# Patient Record
Sex: FEMALE | Race: WHITE | NOT HISPANIC OR LATINO
[De-identification: names, ages, dates, MRNs, and addresses within clinical notes are randomized per-mention and may not be internally consistent; named-entity substitution may affect disease eponyms.]

---

## 2019-07-23 ENCOUNTER — TRANSCRIPTION ENCOUNTER (OUTPATIENT)
Age: 69
End: 2019-07-23

## 2019-07-24 ENCOUNTER — OUTPATIENT (OUTPATIENT)
Dept: OUTPATIENT SERVICES | Facility: HOSPITAL | Age: 69
LOS: 1 days | End: 2019-07-24
Payer: MEDICARE

## 2019-07-24 VITALS
HEIGHT: 64 IN | TEMPERATURE: 98 F | OXYGEN SATURATION: 97 % | WEIGHT: 182.1 LBS | RESPIRATION RATE: 18 BRPM | HEART RATE: 74 BPM | SYSTOLIC BLOOD PRESSURE: 167 MMHG | DIASTOLIC BLOOD PRESSURE: 72 MMHG

## 2019-07-24 VITALS
OXYGEN SATURATION: 97 % | HEART RATE: 69 BPM | RESPIRATION RATE: 12 BRPM | DIASTOLIC BLOOD PRESSURE: 80 MMHG | SYSTOLIC BLOOD PRESSURE: 150 MMHG

## 2019-07-24 DIAGNOSIS — Z98.891 HISTORY OF UTERINE SCAR FROM PREVIOUS SURGERY: Chronic | ICD-10-CM

## 2019-07-24 DIAGNOSIS — H25.12 AGE-RELATED NUCLEAR CATARACT, LEFT EYE: ICD-10-CM

## 2019-07-24 LAB — GLUCOSE BLDC GLUCOMTR-MCNC: 127 MG/DL — HIGH (ref 70–99)

## 2019-07-24 PROCEDURE — 66984 XCAPSL CTRC RMVL W/O ECP: CPT | Mod: LT

## 2019-07-24 PROCEDURE — V2788: CPT

## 2019-07-24 PROCEDURE — 82962 GLUCOSE BLOOD TEST: CPT

## 2019-07-24 NOTE — ASU DISCHARGE PLAN (ADULT/PEDIATRIC) - NURSING INSTRUCTIONS
Follow up appointment with Dr. Hudson on 7/25/19 @10:15AM 0 = swallows foods/liquids without difficulty

## 2019-07-24 NOTE — ASU PATIENT PROFILE, ADULT - PMH
Asthma    Cataracts, bilateral    Diabetes  type II  GERD (gastroesophageal reflux disease)    Hyperlipidemia    Lower back pain    Osteoporosis    Salivary gland stone    Sleep apnea    UTI (urinary tract infection)

## 2019-07-24 NOTE — ASU DISCHARGE PLAN (ADULT/PEDIATRIC) - CALL YOUR DOCTOR IF YOU HAVE ANY OF THE FOLLOWING:
Pain not relieved by Medications/Fever greater than (need to indicate Fahrenheit or Celsius)/Swelling that gets worse/Bleeding that does not stop/Nausea and vomiting that does not stop

## 2019-07-24 NOTE — ASU DISCHARGE PLAN (ADULT/PEDIATRIC) - PATIENT EDUCATION MATERIALS PROVIED
Other (specify)/Intraocular lens implant(IOL), Eye shield with instructions , sunglasses and eye kit given to patient./Implant card (specify)

## 2019-07-24 NOTE — ASU DISCHARGE PLAN (ADULT/PEDIATRIC) - CARE PROVIDER_API CALL
Jenn Hudson)  Ophthalmology  [address] [facility], [address] [place] [address]  Phone: [phone]  Fax: [phone]  Follow Up Time:

## 2019-08-03 PROBLEM — E78.5 HYPERLIPIDEMIA, UNSPECIFIED: Chronic | Status: ACTIVE | Noted: 2019-07-24

## 2019-08-03 PROBLEM — K11.5 SIALOLITHIASIS: Chronic | Status: ACTIVE | Noted: 2019-07-24

## 2019-08-03 PROBLEM — M81.0 AGE-RELATED OSTEOPOROSIS WITHOUT CURRENT PATHOLOGICAL FRACTURE: Chronic | Status: ACTIVE | Noted: 2019-07-24

## 2019-08-03 PROBLEM — M54.5 LOW BACK PAIN: Chronic | Status: ACTIVE | Noted: 2019-07-24

## 2019-08-03 PROBLEM — E11.9 TYPE 2 DIABETES MELLITUS WITHOUT COMPLICATIONS: Chronic | Status: ACTIVE | Noted: 2019-07-24

## 2019-08-03 PROBLEM — K21.9 GASTRO-ESOPHAGEAL REFLUX DISEASE WITHOUT ESOPHAGITIS: Chronic | Status: ACTIVE | Noted: 2019-07-24

## 2019-08-03 PROBLEM — H26.9 UNSPECIFIED CATARACT: Chronic | Status: ACTIVE | Noted: 2019-07-24

## 2019-08-03 PROBLEM — J45.909 UNSPECIFIED ASTHMA, UNCOMPLICATED: Chronic | Status: ACTIVE | Noted: 2019-07-24

## 2019-08-03 PROBLEM — N39.0 URINARY TRACT INFECTION, SITE NOT SPECIFIED: Chronic | Status: ACTIVE | Noted: 2019-07-24

## 2019-09-30 NOTE — ASU PATIENT PROFILE, ADULT - PATIENT REPRESENTATIVE NAME
Pt didn't return phone calls. PHQ-9 missed. I will close the encounter until further outreach. Leny Flowers CMA ([facility])     Josephine ortega

## 2020-01-21 ENCOUNTER — TRANSCRIPTION ENCOUNTER (OUTPATIENT)
Age: 70
End: 2020-01-21

## 2020-01-22 ENCOUNTER — OUTPATIENT (OUTPATIENT)
Dept: OUTPATIENT SERVICES | Facility: HOSPITAL | Age: 70
LOS: 1 days | End: 2020-01-22
Payer: MEDICARE

## 2020-01-22 VITALS
RESPIRATION RATE: 15 BRPM | OXYGEN SATURATION: 98 % | SYSTOLIC BLOOD PRESSURE: 155 MMHG | HEART RATE: 69 BPM | HEIGHT: 64 IN | WEIGHT: 182.98 LBS | DIASTOLIC BLOOD PRESSURE: 93 MMHG | TEMPERATURE: 98 F

## 2020-01-22 VITALS
DIASTOLIC BLOOD PRESSURE: 72 MMHG | SYSTOLIC BLOOD PRESSURE: 143 MMHG | OXYGEN SATURATION: 100 % | HEART RATE: 64 BPM | RESPIRATION RATE: 17 BRPM

## 2020-01-22 DIAGNOSIS — Z98.891 HISTORY OF UTERINE SCAR FROM PREVIOUS SURGERY: Chronic | ICD-10-CM

## 2020-01-22 DIAGNOSIS — Z98.49 CATARACT EXTRACTION STATUS, UNSPECIFIED EYE: Chronic | ICD-10-CM

## 2020-01-22 DIAGNOSIS — Z98.890 OTHER SPECIFIED POSTPROCEDURAL STATES: Chronic | ICD-10-CM

## 2020-01-22 DIAGNOSIS — H25.11 AGE-RELATED NUCLEAR CATARACT, RIGHT EYE: ICD-10-CM

## 2020-01-22 LAB — GLUCOSE BLDC GLUCOMTR-MCNC: 106 MG/DL — HIGH (ref 70–99)

## 2020-01-22 PROCEDURE — 82962 GLUCOSE BLOOD TEST: CPT

## 2020-01-22 PROCEDURE — V2632: CPT

## 2020-01-22 PROCEDURE — 66984 XCAPSL CTRC RMVL W/O ECP: CPT | Mod: RT

## 2020-01-22 NOTE — ASU DISCHARGE PLAN (ADULT/PEDIATRIC) - CALL YOUR DOCTOR IF YOU HAVE ANY OF THE FOLLOWING:
Fever greater than (need to indicate Fahrenheit or Celsius)/Bleeding that does not stop/Increased irritability or sluggishness/Pain not relieved by Medications/Nausea and vomiting that does not stop/Inability to tolerate liquids or foods

## 2020-01-22 NOTE — ASU DISCHARGE PLAN (ADULT/PEDIATRIC) - ASU DC SPECIAL INSTRUCTIONSFT
do not rub eye. tylenol for discomfort.  avoid advil motrin aleve aspirin to decrease chance of bleeding. eye kit given to pt.  Discharge and follow up  instructions explained to pt.  pt understands proper use of eye drops and instructions. f/u tomorrow@ 1030am do not rub eye. tylenol for discomfort.  avoid advil motrin aleve aspirin to decrease chance of bleeding. eye kit given to pt.  Discharge and follow up  instructions explained to pt.  pt understands proper use of eye drops and instructions. f/u tomorrow@ 1030am  Please call the office and page the Doctor if you experience pain and / or nausea

## 2020-01-22 NOTE — ASU DISCHARGE PLAN (ADULT/PEDIATRIC) - CARE PROVIDER_API CALL
Jenn Hudson)  Ophthalmology  [address] [facility], [address] [place] [address]  Phone: [phone]  Fax: [phone]  Follow Up Time:

## 2020-02-28 ENCOUNTER — TRANSCRIPTION ENCOUNTER (OUTPATIENT)
Age: 70
End: 2020-02-28

## 2020-07-07 ENCOUNTER — TRANSCRIPTION ENCOUNTER (OUTPATIENT)
Age: 70
End: 2020-07-07

## 2020-07-16 PROBLEM — G47.30 SLEEP APNEA, UNSPECIFIED: Chronic | Status: ACTIVE | Noted: 2019-07-24

## 2020-07-29 DIAGNOSIS — Z01.818 ENCOUNTER FOR OTHER PREPROCEDURAL EXAMINATION: ICD-10-CM

## 2020-07-29 PROBLEM — Z00.00 ENCOUNTER FOR PREVENTIVE HEALTH EXAMINATION: Status: ACTIVE | Noted: 2020-07-29

## 2020-08-01 ENCOUNTER — APPOINTMENT (OUTPATIENT)
Dept: DISASTER EMERGENCY | Facility: CLINIC | Age: 70
End: 2020-08-01

## 2020-08-02 LAB — SARS-COV-2 N GENE NPH QL NAA+PROBE: NOT DETECTED

## 2020-10-01 ENCOUNTER — TRANSCRIPTION ENCOUNTER (OUTPATIENT)
Age: 70
End: 2020-10-01

## 2020-11-27 ENCOUNTER — TRANSCRIPTION ENCOUNTER (OUTPATIENT)
Age: 70
End: 2020-11-27

## 2020-12-03 ENCOUNTER — TRANSCRIPTION ENCOUNTER (OUTPATIENT)
Age: 70
End: 2020-12-03

## 2020-12-26 ENCOUNTER — TRANSCRIPTION ENCOUNTER (OUTPATIENT)
Age: 70
End: 2020-12-26

## 2021-08-09 ENCOUNTER — TRANSCRIPTION ENCOUNTER (OUTPATIENT)
Age: 71
End: 2021-08-09

## 2021-08-12 ENCOUNTER — TRANSCRIPTION ENCOUNTER (OUTPATIENT)
Age: 71
End: 2021-08-12

## 2021-09-27 ENCOUNTER — TRANSCRIPTION ENCOUNTER (OUTPATIENT)
Age: 71
End: 2021-09-27

## 2022-01-13 ENCOUNTER — TRANSCRIPTION ENCOUNTER (OUTPATIENT)
Age: 72
End: 2022-01-13

## 2022-08-25 ENCOUNTER — NON-APPOINTMENT (OUTPATIENT)
Age: 72
End: 2022-08-25

## 2022-12-18 ENCOUNTER — NON-APPOINTMENT (OUTPATIENT)
Age: 72
End: 2022-12-18

## 2022-12-26 ENCOUNTER — NON-APPOINTMENT (OUTPATIENT)
Age: 72
End: 2022-12-26

## 2023-03-21 NOTE — ASU PATIENT PROFILE, ADULT - CAREGIVER PHONE NUMBER
[phone] Cimzia Pregnancy And Lactation Text: This medication crosses the placenta but can be considered safe in certain situations. Cimzia may be excreted in breast milk.

## 2023-03-22 ENCOUNTER — TRANSCRIPTION ENCOUNTER (OUTPATIENT)
Age: 73
End: 2023-03-22

## 2023-03-23 ENCOUNTER — OUTPATIENT (OUTPATIENT)
Dept: OUTPATIENT SERVICES | Facility: HOSPITAL | Age: 73
LOS: 1 days | End: 2023-03-23
Payer: MEDICARE

## 2023-03-23 DIAGNOSIS — Z12.11 ENCOUNTER FOR SCREENING FOR MALIGNANT NEOPLASM OF COLON: ICD-10-CM

## 2023-03-23 DIAGNOSIS — Z98.890 OTHER SPECIFIED POSTPROCEDURAL STATES: Chronic | ICD-10-CM

## 2023-03-23 DIAGNOSIS — Z98.891 HISTORY OF UTERINE SCAR FROM PREVIOUS SURGERY: Chronic | ICD-10-CM

## 2023-03-23 DIAGNOSIS — K21.9 GASTRO-ESOPHAGEAL REFLUX DISEASE WITHOUT ESOPHAGITIS: ICD-10-CM

## 2023-03-23 DIAGNOSIS — Z98.49 CATARACT EXTRACTION STATUS, UNSPECIFIED EYE: Chronic | ICD-10-CM

## 2023-03-23 PROCEDURE — 88305 TISSUE EXAM BY PATHOLOGIST: CPT | Mod: 26

## 2023-03-23 PROCEDURE — 43239 EGD BIOPSY SINGLE/MULTIPLE: CPT

## 2023-03-23 PROCEDURE — 88313 SPECIAL STAINS GROUP 2: CPT | Mod: 26

## 2023-03-23 PROCEDURE — 82962 GLUCOSE BLOOD TEST: CPT

## 2023-03-23 PROCEDURE — 88312 SPECIAL STAINS GROUP 1: CPT | Mod: 26

## 2023-03-23 PROCEDURE — 45385 COLONOSCOPY W/LESION REMOVAL: CPT | Mod: PT

## 2023-03-23 PROCEDURE — 88305 TISSUE EXAM BY PATHOLOGIST: CPT

## 2023-03-23 PROCEDURE — 88312 SPECIAL STAINS GROUP 1: CPT

## 2023-03-23 PROCEDURE — 45380 COLONOSCOPY AND BIOPSY: CPT | Mod: XS,PT

## 2023-03-23 PROCEDURE — 88313 SPECIAL STAINS GROUP 2: CPT

## 2023-03-24 LAB — SURGICAL PATHOLOGY STUDY: SIGNIFICANT CHANGE UP

## 2023-08-16 ENCOUNTER — NON-APPOINTMENT (OUTPATIENT)
Age: 73
End: 2023-08-16

## 2024-11-08 ENCOUNTER — NON-APPOINTMENT (OUTPATIENT)
Age: 74
End: 2024-11-08

## 2025-05-31 ENCOUNTER — NON-APPOINTMENT (OUTPATIENT)
Age: 75
End: 2025-05-31

## 2025-07-26 ENCOUNTER — NON-APPOINTMENT (OUTPATIENT)
Age: 75
End: 2025-07-26

## (undated) DEVICE — STERIS DEFENDO 3-PIECE KIT (AIR/WATER, SUCTION & BIOPSY VALVES)

## (undated) DEVICE — CATH IV SAFE BC 22G X 1" (BLUE)

## (undated) DEVICE — SOL IRR NS 0.9% 250ML

## (undated) DEVICE — TUBING CANNULA SALTER LABS NASAL ADULT 7FT

## (undated) DEVICE — CATH IV SAFE BC 20G X 1.16" (PINK)

## (undated) DEVICE — CANISTER SUCTION 1200CC 10/SL

## (undated) DEVICE — TUBE RECTAL 24FR

## (undated) DEVICE — SNARE POLYP SENS 27MM 240CM

## (undated) DEVICE — MASK OXYGEN PANORAMIC

## (undated) DEVICE — SYR LUER SLIP TIP 50CC

## (undated) DEVICE — TRAP QUICK CATCH  SINGL CHAMBER

## (undated) DEVICE — RETRIEVER ROTH NET PLATINUM-UNIVERSAL

## (undated) DEVICE — BRUSH COLONOSCOPY CYTOLOGY

## (undated) DEVICE — FORCEP RADIAL JAW 4 JUMBO 2.8MM 3.2MM 240CM ORANGE DISP

## (undated) DEVICE — CATH ELECHMSTAT  INJ 7FR 210CM

## (undated) DEVICE — SET IV PUMP BLOOD 1VALVE 180FILTER NON-DEHP

## (undated) DEVICE — FORMALIN CUPS 10% BUFFERED

## (undated) DEVICE — PACK IV START WITH CHG

## (undated) DEVICE — SNARE LRG

## (undated) DEVICE — TUBING IV SET GRAVITY 3Y 100" MACRO

## (undated) DEVICE — SUCTION YANKAUER TAPERED BULBOUS NO VENT

## (undated) DEVICE — TUBING IV SET SECOND 34" W/O LOK-BLUNT

## (undated) DEVICE — TRAP SPECIMEN SPUTUM 40CC

## (undated) DEVICE — DRSG CURITY GAUZE SPONGE 4 X 4" 12-PLY

## (undated) DEVICE — SUT HEWSON RETRIEVER

## (undated) DEVICE — SYR LUER LOK 30CC

## (undated) DEVICE — POLY TRAP ETRAP

## (undated) DEVICE — BITE BLOCK MAXI RUBBER STAMP

## (undated) DEVICE — NDL INJ SCLERO INTERJECT 23G

## (undated) DEVICE — TUBE O2 SUPL CRUSH RESIS CONN SOUTHSIDE ONLY

## (undated) DEVICE — TUBING SUCTION CONN 6FT STERILE

## (undated) DEVICE — BRUSH CYTO ENDO

## (undated) DEVICE — RADIAL JAW 4 LG CAPACITY WITH NDL

## (undated) DEVICE — FORCEP RADIAL JAW 4 W NDL 2.4MM 2.8MM 240CM ORANGE DISP

## (undated) DEVICE — TUBING IV SET SECONDARY 34"

## (undated) DEVICE — MASK O2 NON REBREATH 3IN1 ADULT

## (undated) DEVICE — CLAMP BX HOT RAD JAW 3

## (undated) DEVICE — CATH ELCTR GLIDE PRB 7FR

## (undated) DEVICE — SYR IV POSIFLUSH NS 3ML 30/TY

## (undated) DEVICE — SYR LUER SLIP TIP 30CC

## (undated) DEVICE — ELCTR GROUNDING PAD ADULT COVIDIEN

## (undated) DEVICE — SOL IRR POUR H2O 500ML

## (undated) DEVICE — FORCEP RADIAL JAW 4 W NDL 2.2MM 2.8MM 160CM YELLOW DISP

## (undated) DEVICE — VALVE BIOPSY

## (undated) DEVICE — ENDOCUFF VISION SZ 2 LG GRN

## (undated) DEVICE — MARKER ENDO SPOT EX

## (undated) DEVICE — Device

## (undated) DEVICE — ENDOCUFF VISION SZ 3 SM PRPL

## (undated) DEVICE — SNARE CAPTIVATOR RND COLD STIFF 10X2.8MM

## (undated) DEVICE — SYR ALLIANCE II INFLATION 60ML

## (undated) DEVICE — SENSOR O2 FINGER XL ADULT 24/BX 6BX/CA